# Patient Record
Sex: FEMALE | Race: WHITE | NOT HISPANIC OR LATINO | Employment: PART TIME | URBAN - METROPOLITAN AREA
[De-identification: names, ages, dates, MRNs, and addresses within clinical notes are randomized per-mention and may not be internally consistent; named-entity substitution may affect disease eponyms.]

---

## 2020-06-30 ENCOUNTER — TELEPHONE (OUTPATIENT)
Dept: OPHTHALMOLOGY | Facility: CLINIC | Age: 65
End: 2020-06-30

## 2020-06-30 NOTE — TELEPHONE ENCOUNTER
----- Message from Ursula Flowers sent at 6/30/2020  4:52 PM CDT -----  Regarding: Appointment  Contact: Rosa Vallecillo called in regards to scheduling a appointment with a female physician . Pt can be reached at [phone] (home)

## 2020-07-07 ENCOUNTER — OFFICE VISIT (OUTPATIENT)
Dept: OPHTHALMOLOGY | Facility: CLINIC | Age: 65
End: 2020-07-07
Payer: MEDICARE

## 2020-07-07 DIAGNOSIS — H43.813 POSTERIOR VITREOUS DETACHMENT OF BOTH EYES: ICD-10-CM

## 2020-07-07 DIAGNOSIS — Z83.511 FAMILY HISTORY OF GLAUCOMA: ICD-10-CM

## 2020-07-07 DIAGNOSIS — H40.013 OPEN ANGLE WITH BORDERLINE FINDINGS, LOW RISK, BILATERAL: Primary | ICD-10-CM

## 2020-07-07 DIAGNOSIS — H04.129 DRY EYE: ICD-10-CM

## 2020-07-07 PROCEDURE — 92004 PR EYE EXAM, NEW PATIENT,COMPREHESV: ICD-10-PCS | Mod: S$GLB,,, | Performed by: OPHTHALMOLOGY

## 2020-07-07 PROCEDURE — 92133 POSTERIOR SEGMENT OCT OPTIC NERVE(OCULAR COHERENCE TOMOGRAPHY) - OU - BOTH EYES: ICD-10-PCS | Mod: S$GLB,,, | Performed by: OPHTHALMOLOGY

## 2020-07-07 PROCEDURE — 99999 PR PBB SHADOW E&M-EST. PATIENT-LVL II: ICD-10-PCS | Mod: PBBFAC,,, | Performed by: OPHTHALMOLOGY

## 2020-07-07 PROCEDURE — 92004 COMPRE OPH EXAM NEW PT 1/>: CPT | Mod: S$GLB,,, | Performed by: OPHTHALMOLOGY

## 2020-07-07 PROCEDURE — 92133 CPTRZD OPH DX IMG PST SGM ON: CPT | Mod: S$GLB,,, | Performed by: OPHTHALMOLOGY

## 2020-07-07 PROCEDURE — 99999 PR PBB SHADOW E&M-EST. PATIENT-LVL II: CPT | Mod: PBBFAC,,, | Performed by: OPHTHALMOLOGY

## 2020-07-07 RX ORDER — ESTRADIOL 0.1 MG/G
2 CREAM VAGINAL
COMMUNITY

## 2020-07-07 RX ORDER — SUMATRIPTAN SUCCINATE 100 MG/1
100 TABLET ORAL
COMMUNITY

## 2020-07-07 RX ORDER — MONTELUKAST SODIUM 10 MG/1
TABLET ORAL
COMMUNITY
Start: 2020-06-11

## 2020-07-07 RX ORDER — LISINOPRIL 10 MG/1
TABLET ORAL
COMMUNITY
Start: 2020-05-31

## 2020-07-07 RX ORDER — LEVOTHYROXINE SODIUM 88 UG/1
TABLET ORAL
COMMUNITY
Start: 2020-05-31

## 2020-07-07 RX ORDER — ATORVASTATIN CALCIUM 20 MG/1
20 TABLET, FILM COATED ORAL
COMMUNITY
Start: 2018-06-19

## 2020-07-07 NOTE — PROGRESS NOTES
SUBJECTIVE  Rosa Ledesma is 64 y.o. female  Corrected distance visual acuity was 20/20 in the right eye and 20/20 in the left eye.   Chief Complaint   Patient presents with    Spots and/or Floaters          HPI     Pt is a new pt. Pt's last eye appt was a year ago at [facility] on   [place]. Pt here for floaters evaluation and dry eye chk. She says that   she has been experiencing floaters ever since she was a kid. They got   worse slowly over the past year. OD>OS. Pt states that every now and then,   she experiences a sharp pain near her puncta OU. She also says that every   now and then, she feels a fb sensation. Pt also says that her distance   vision is a little cloudy. She uses readers for near activities like   looking at a computer screen.     Self referral    1. Dry eyes    Last edited by Boston Gong, Patient Care Assistant on 7/7/2020  3:53   PM. (History)         Assessment /Plan :  1. Open angle with borderline findings, low risk, bilateral No evidence of glaucoma at this time but based on risk factors recommend to continue monitoring.     2. Family history of glaucoma    3. Posterior vitreous detachment of both eyes Patient seen and evaluated for PVD OU. No tears or breaks were seen after careful retinal evaluation. Discussed retinal detachment signs and symptoms including flashes of lights, floaters, perceived curtains or veils. Advised to patient to monitor visual status including increase in flashes and floaters, or the development of visual field changes including curtain and /or veils. Advised patient to RTC urgently if these symptoms occur. Explained the need for follow up exams to the patient even if there are no changes in the symptoms.       4.      Dry Eyes Findings and symptoms consistent with dry eyes. Dry eye instruction sheet reviewed with patient            recommending:AT's qid/titrate prn, Lubricating Oint qhs and prn and [place] 3 Fish Oils 6000-5309 mg po bid     Return to  clinic in 1 year  or as needed.  With GOCT and Dilation

## 2021-07-20 ENCOUNTER — OFFICE VISIT (OUTPATIENT)
Dept: OPHTHALMOLOGY | Facility: CLINIC | Age: 66
End: 2021-07-20
Payer: MEDICARE

## 2021-07-20 DIAGNOSIS — H40.013 OPEN ANGLE WITH BORDERLINE FINDINGS, LOW RISK, BILATERAL: Primary | ICD-10-CM

## 2021-07-20 DIAGNOSIS — H04.129 DRY EYE: ICD-10-CM

## 2021-07-20 DIAGNOSIS — Z83.511 FAMILY HISTORY OF GLAUCOMA: ICD-10-CM

## 2021-07-20 DIAGNOSIS — H43.813 POSTERIOR VITREOUS DETACHMENT OF BOTH EYES: ICD-10-CM

## 2021-07-20 PROCEDURE — 92133 POSTERIOR SEGMENT OCT OPTIC NERVE(OCULAR COHERENCE TOMOGRAPHY) - OU - BOTH EYES: ICD-10-PCS | Mod: S$GLB,,, | Performed by: OPHTHALMOLOGY

## 2021-07-20 PROCEDURE — 92133 CPTRZD OPH DX IMG PST SGM ON: CPT | Mod: S$GLB,,, | Performed by: OPHTHALMOLOGY

## 2021-07-20 PROCEDURE — 99999 PR PBB SHADOW E&M-EST. PATIENT-LVL II: CPT | Mod: PBBFAC,,, | Performed by: OPHTHALMOLOGY

## 2021-07-20 PROCEDURE — 99999 PR PBB SHADOW E&M-EST. PATIENT-LVL II: ICD-10-PCS | Mod: PBBFAC,,, | Performed by: OPHTHALMOLOGY

## 2021-07-20 PROCEDURE — 92014 PR EYE EXAM, EST PATIENT,COMPREHESV: ICD-10-PCS | Mod: S$GLB,,, | Performed by: OPHTHALMOLOGY

## 2021-07-20 PROCEDURE — 92014 COMPRE OPH EXAM EST PT 1/>: CPT | Mod: S$GLB,,, | Performed by: OPHTHALMOLOGY

## 2021-07-20 RX ORDER — CYCLOSPORINE 0.5 MG/ML
1 EMULSION OPHTHALMIC 2 TIMES DAILY
Qty: 60 VIAL | Refills: 12 | Status: SHIPPED | OUTPATIENT
Start: 2021-07-20

## 2021-07-20 RX ORDER — MUPIROCIN 20 MG/G
OINTMENT TOPICAL
COMMUNITY
Start: 2021-06-09

## 2021-07-20 RX ORDER — HYDROGEN PEROXIDE 3 %
SOLUTION, NON-ORAL MISCELLANEOUS
COMMUNITY

## 2021-07-20 RX ORDER — ASPIRIN 81 MG/1
TABLET ORAL
COMMUNITY

## 2021-07-20 RX ORDER — NYSTATIN 100000 U/G
CREAM TOPICAL
COMMUNITY
Start: 2021-06-17

## 2022-02-02 ENCOUNTER — TELEPHONE (OUTPATIENT)
Dept: OPHTHALMOLOGY | Facility: CLINIC | Age: 67
End: 2022-02-02
Payer: MEDICARE

## 2022-02-02 NOTE — TELEPHONE ENCOUNTER
----- Message from Teresa Senior sent at 2/2/2022  1:43 PM CST -----  Contact: Pt Mobile  [phone]  Patient would like a call back in regards to her saying that her eyes have been twitching since Enders time, especially her left eye and she would like to speak with you about this please.

## 2022-02-02 NOTE — TELEPHONE ENCOUNTER
Called pt and let her know that Dr. Sanford described what she has as benign eyelid tremors and they can be brought on by sleep deprivation, caffeine intake, and stress. Pt says she will work on all of those and will see if there is any improvement in her lids.

## 2022-07-06 ENCOUNTER — TELEPHONE (OUTPATIENT)
Dept: OPHTHALMOLOGY | Facility: CLINIC | Age: 67
End: 2022-07-06
Payer: MEDICARE

## 2022-07-06 NOTE — TELEPHONE ENCOUNTER
----- Message from Valencia Gibbs sent at 7/6/2022 11:48 AM CDT -----  Contact: Rosa  Type:  Sooner Apoointment Request    Caller is requesting a sooner appointment.  Caller will not accept being placed on the waitlist and is requesting a message be sent to doctor.  Name of Caller:Rosa  When is the first available appointment?scheduled 7/19/22  Symptoms:yearly dilation  Would the patient rather a call back or a response via [employer]? call  Best Call Back Number:[phone] or [phone]   Additional Information: Patient reports needing to reschedule 7/19/22 appointment for a later date following 8/1/22. Please give patient a call back when possible.  Thank you,  GH

## 2023-07-17 ENCOUNTER — OFFICE VISIT (OUTPATIENT)
Dept: OPHTHALMOLOGY | Facility: CLINIC | Age: 68
End: 2023-07-17
Payer: MEDICARE

## 2023-07-17 DIAGNOSIS — H40.013 OPEN ANGLE WITH BORDERLINE FINDINGS, LOW RISK, BILATERAL: Primary | ICD-10-CM

## 2023-07-17 DIAGNOSIS — Z83.511 FAMILY HISTORY OF GLAUCOMA: ICD-10-CM

## 2023-07-17 DIAGNOSIS — H04.129 DRY EYE: ICD-10-CM

## 2023-07-17 DIAGNOSIS — H26.9 CORTICAL CATARACT OF BOTH EYES: ICD-10-CM

## 2023-07-17 DIAGNOSIS — H43.813 POSTERIOR VITREOUS DETACHMENT OF BOTH EYES: ICD-10-CM

## 2023-07-17 PROCEDURE — 1160F RVW MEDS BY RX/DR IN RCRD: CPT | Mod: CPTII,S$GLB,, | Performed by: OPHTHALMOLOGY

## 2023-07-17 PROCEDURE — 1159F MED LIST DOCD IN RCRD: CPT | Mod: CPTII,S$GLB,, | Performed by: OPHTHALMOLOGY

## 2023-07-17 PROCEDURE — 1159F PR MEDICATION LIST DOCUMENTED IN MEDICAL RECORD: ICD-10-PCS | Mod: CPTII,S$GLB,, | Performed by: OPHTHALMOLOGY

## 2023-07-17 PROCEDURE — 4010F PR ACE/ARB THEARPY RXD/TAKEN: ICD-10-PCS | Mod: CPTII,S$GLB,, | Performed by: OPHTHALMOLOGY

## 2023-07-17 PROCEDURE — 4010F ACE/ARB THERAPY RXD/TAKEN: CPT | Mod: CPTII,S$GLB,, | Performed by: OPHTHALMOLOGY

## 2023-07-17 PROCEDURE — 1160F PR REVIEW ALL MEDS BY PRESCRIBER/CLIN PHARMACIST DOCUMENTED: ICD-10-PCS | Mod: CPTII,S$GLB,, | Performed by: OPHTHALMOLOGY

## 2023-07-17 PROCEDURE — 99213 OFFICE O/P EST LOW 20 MIN: CPT | Mod: S$GLB,,, | Performed by: OPHTHALMOLOGY

## 2023-07-17 PROCEDURE — 99999 PR PBB SHADOW E&M-EST. PATIENT-LVL II: CPT | Mod: PBBFAC,,, | Performed by: OPHTHALMOLOGY

## 2023-07-17 PROCEDURE — 99213 PR OFFICE/OUTPT VISIT, EST, LEVL III, 20-29 MIN: ICD-10-PCS | Mod: S$GLB,,, | Performed by: OPHTHALMOLOGY

## 2023-07-17 PROCEDURE — 99999 PR PBB SHADOW E&M-EST. PATIENT-LVL II: ICD-10-PCS | Mod: PBBFAC,,, | Performed by: OPHTHALMOLOGY

## 2023-07-17 RX ORDER — CYCLOSPORINE 0.5 MG/ML
1 EMULSION OPHTHALMIC 2 TIMES DAILY
Qty: 60 EACH | Refills: 11 | Status: SHIPPED | OUTPATIENT
Start: 2023-07-17 | End: 2024-07-16

## 2023-07-17 NOTE — PROGRESS NOTES
SUBJECTIVE  Rosa Ledesma is 68 y.o. female  Corrected distance visual acuity was 20/30 +1 in the right eye and 20/30 -2 in the left eye.   Chief Complaint   Patient presents with    Annual Exam     Pt reports for annual exam. Denies any pain or irritation. Va stable. No drops. Stopped restasis but would like to resume due to dry eyes. Noticing some floaters more frequently, some are denser than others.           HPI     Annual Exam     Additional comments: Pt reports for annual exam. Denies any pain or   irritation. Va stable. No drops. Stopped restasis but would like to resume   due to dry eyes. Noticing some floaters more frequently, some are denser   than others.            Comments      1. Fhx glaucoma (mat grandmother)  2. Dry eyes / mild dry mouth  3. PVD OU            Last edited by William Hennessy on 7/17/2023  1:50 PM.         Assessment /Plan :  1. Open angle with borderline findings, low risk, bilateral No evidence of glaucoma at this time but based on risk factors recommend to continue monitoring.    Return to clinic in 1 year  or as needed.  With GOCT and Dilation     2. Family history of glaucoma    3. Posterior vitreous detachment of both eyes  -- Condition stable, no therapeutic change required. Monitoring routinely.     4. Dry eye Findings and symptoms consistent with moderate dry eyes. Dry eye instruction sheet reviewed with patient recommending:AT's qid/titrate prn, [place] 3 Fish Oils 1315-8213 mg po bid, and Restasis gtts bid

## 2024-07-03 ENCOUNTER — OFFICE VISIT (OUTPATIENT)
Dept: OPHTHALMOLOGY | Facility: CLINIC | Age: 69
End: 2024-07-03
Payer: MEDICARE

## 2024-07-03 DIAGNOSIS — H40.013 OPEN ANGLE WITH BORDERLINE FINDINGS, LOW RISK, BILATERAL: Primary | ICD-10-CM

## 2024-07-03 DIAGNOSIS — H31.003 CHORIORETINAL SCAR OF BOTH EYES: ICD-10-CM

## 2024-07-03 DIAGNOSIS — H26.9 CORTICAL CATARACT OF BOTH EYES: ICD-10-CM

## 2024-07-03 DIAGNOSIS — M35.01 KERATITIS SICCA, BILATERAL: ICD-10-CM

## 2024-07-03 PROCEDURE — 1126F AMNT PAIN NOTED NONE PRSNT: CPT | Mod: CPTII,S$GLB,, | Performed by: OPHTHALMOLOGY

## 2024-07-03 PROCEDURE — 1160F RVW MEDS BY RX/DR IN RCRD: CPT | Mod: CPTII,S$GLB,, | Performed by: OPHTHALMOLOGY

## 2024-07-03 PROCEDURE — 99999 PR PBB SHADOW E&M-EST. PATIENT-LVL III: CPT | Mod: PBBFAC,,, | Performed by: OPHTHALMOLOGY

## 2024-07-03 PROCEDURE — 4010F ACE/ARB THERAPY RXD/TAKEN: CPT | Mod: CPTII,S$GLB,, | Performed by: OPHTHALMOLOGY

## 2024-07-03 PROCEDURE — 92133 CPTRZD OPH DX IMG PST SGM ON: CPT | Mod: S$GLB,,, | Performed by: OPHTHALMOLOGY

## 2024-07-03 PROCEDURE — 92014 COMPRE OPH EXAM EST PT 1/>: CPT | Mod: S$GLB,,, | Performed by: OPHTHALMOLOGY

## 2024-07-03 PROCEDURE — 1159F MED LIST DOCD IN RCRD: CPT | Mod: CPTII,S$GLB,, | Performed by: OPHTHALMOLOGY

## 2024-07-03 RX ORDER — CYCLOSPORINE 0.5 MG/ML
1 EMULSION OPHTHALMIC EVERY 12 HOURS
Qty: 5.5 ML | Refills: 12 | Status: SHIPPED | OUTPATIENT
Start: 2024-07-03

## 2024-07-03 NOTE — PROGRESS NOTES
SUBJECTIVE  Rosa Ledesma is 68 y.o. female  Corrected distance visual acuity was 20/20 -2 in the right eye and 20/25 -2 in the left eye.   Chief Complaint   Patient presents with    Annual Exam     1 year follow up GOCT and Dilation. VA is stable, no changes. Having some dry eyes and drops is put in more often. Seeing more floaters.  No pain or irritation. Not on any gtts.           HPI     Annual Exam     Additional comments: 1 year follow up GOCT and Dilation. VA is stable, no   changes. Having some dry eyes and drops is put in more often. Seeing more   floaters.  No pain or irritation. Not on any gtts.            Comments        1. Fhx glaucoma (mat grandmother)  2. Dry eyes / mild dry mouth  3. PVD OU             Last edited by Андрей Chambers on 7/3/2024  9:27 AM.         Assessment /Plan :  1. Open angle with borderline findings, low risk, bilateral No evidence of glaucoma at this time but based on risk factors recommend to continue monitoring.    Return to clinic in 1 year with Dr. Blue or as needed.  With GOCT and Dilation     2. Cortical cataract of both eyes OS>OD - monitor for now   3.      Keratitis sicca, bilateral - add Restasis one drop in each eye 2 times a day, continue Ivizia one drop in each eye as needed  4.      Chorioretinal scar, bilateral - monitor for now, discussed precautions

## 2024-11-14 ENCOUNTER — TELEPHONE (OUTPATIENT)
Dept: OPHTHALMOLOGY | Facility: CLINIC | Age: 69
End: 2024-11-14
Payer: MEDICARE

## 2024-11-14 RX ORDER — CYCLOSPORINE 0.5 MG/ML
1 EMULSION OPHTHALMIC 2 TIMES DAILY
Qty: 60 EACH | Refills: 12 | Status: SHIPPED | OUTPATIENT
Start: 2024-11-14 | End: 2025-11-14

## 2024-11-14 NOTE — TELEPHONE ENCOUNTER
Returned call to patient and told her I forward her message to Ry to see if the insurance will cover the Restasis if we get a P.A. she did receive a letter stating alternative meds covered and it listed Cyclosporin in the single dose vials if the PA does not work.        ----- Message from Summer sent at 11/14/2024 11:00 AM CST -----  Contact: Rosa  Patient called asking for advice about cycloSPORINE (RESTASIS MULTIDOSE) 0.05 % Drop. Her insurance is not going to cover it and she wants to know what other eye drop can she get. Please call patient at [phone]. Thanks!